# Patient Record
Sex: MALE | Race: WHITE | HISPANIC OR LATINO | Employment: FULL TIME | URBAN - METROPOLITAN AREA
[De-identification: names, ages, dates, MRNs, and addresses within clinical notes are randomized per-mention and may not be internally consistent; named-entity substitution may affect disease eponyms.]

---

## 2017-03-09 ENCOUNTER — HOSPITAL ENCOUNTER (OUTPATIENT)
Facility: MEDICAL CENTER | Age: 27
End: 2017-03-09
Attending: INTERNAL MEDICINE
Payer: COMMERCIAL

## 2017-03-09 PROCEDURE — 80053 COMPREHEN METABOLIC PANEL: CPT

## 2017-03-09 PROCEDURE — 83036 HEMOGLOBIN GLYCOSYLATED A1C: CPT

## 2017-03-09 PROCEDURE — 82306 VITAMIN D 25 HYDROXY: CPT

## 2017-03-09 PROCEDURE — 85027 COMPLETE CBC AUTOMATED: CPT

## 2017-03-09 PROCEDURE — 80061 LIPID PANEL: CPT

## 2017-03-09 PROCEDURE — 84443 ASSAY THYROID STIM HORMONE: CPT

## 2017-03-10 LAB
25(OH)D3 SERPL-MCNC: 13 NG/ML (ref 30–100)
ALBUMIN SERPL BCP-MCNC: 4.8 G/DL (ref 3.2–4.9)
ALBUMIN/GLOB SERPL: 1.5 G/DL
ALP SERPL-CCNC: 81 U/L (ref 30–99)
ALT SERPL-CCNC: 23 U/L (ref 2–50)
ANION GAP SERPL CALC-SCNC: 4 MMOL/L (ref 0–11.9)
AST SERPL-CCNC: 19 U/L (ref 12–45)
BILIRUB SERPL-MCNC: 0.7 MG/DL (ref 0.1–1.5)
BUN SERPL-MCNC: 21 MG/DL (ref 8–22)
CALCIUM SERPL-MCNC: 10.1 MG/DL (ref 8.5–10.5)
CHLORIDE SERPL-SCNC: 104 MMOL/L (ref 96–112)
CHOLEST SERPL-MCNC: 191 MG/DL (ref 100–199)
CO2 SERPL-SCNC: 30 MMOL/L (ref 20–33)
CREAT SERPL-MCNC: 1.24 MG/DL (ref 0.5–1.4)
ERYTHROCYTE [DISTWIDTH] IN BLOOD BY AUTOMATED COUNT: 43 FL (ref 35.9–50)
EST. AVERAGE GLUCOSE BLD GHB EST-MCNC: 103 MG/DL
GLOBULIN SER CALC-MCNC: 3.3 G/DL (ref 1.9–3.5)
GLUCOSE SERPL-MCNC: 82 MG/DL (ref 65–99)
HBA1C MFR BLD: 5.2 % (ref 0–5.6)
HCT VFR BLD AUTO: 50.7 % (ref 42–52)
HDLC SERPL-MCNC: 56 MG/DL
HGB BLD-MCNC: 17.1 G/DL (ref 14–18)
LDLC SERPL CALC-MCNC: 112 MG/DL
MCH RBC QN AUTO: 31.4 PG (ref 27–33)
MCHC RBC AUTO-ENTMCNC: 33.7 G/DL (ref 33.7–35.3)
MCV RBC AUTO: 93.2 FL (ref 81.4–97.8)
PLATELET # BLD AUTO: 272 K/UL (ref 164–446)
PMV BLD AUTO: 11.2 FL (ref 9–12.9)
POTASSIUM SERPL-SCNC: 4.5 MMOL/L (ref 3.6–5.5)
PROT SERPL-MCNC: 8.1 G/DL (ref 6–8.2)
RBC # BLD AUTO: 5.44 M/UL (ref 4.7–6.1)
SODIUM SERPL-SCNC: 138 MMOL/L (ref 135–145)
TRIGL SERPL-MCNC: 116 MG/DL (ref 0–149)
TSH SERPL DL<=0.005 MIU/L-ACNC: 1.46 UIU/ML (ref 0.3–3.7)
WBC # BLD AUTO: 4 K/UL (ref 4.8–10.8)

## 2018-06-25 ENCOUNTER — APPOINTMENT (OUTPATIENT)
Dept: RADIOLOGY | Facility: MEDICAL CENTER | Age: 28
End: 2018-06-25
Attending: EMERGENCY MEDICINE
Payer: COMMERCIAL

## 2018-06-25 ENCOUNTER — HOSPITAL ENCOUNTER (EMERGENCY)
Facility: MEDICAL CENTER | Age: 28
End: 2018-06-26
Attending: EMERGENCY MEDICINE
Payer: COMMERCIAL

## 2018-06-25 ENCOUNTER — OFFICE VISIT (OUTPATIENT)
Dept: URGENT CARE | Facility: CLINIC | Age: 28
End: 2018-06-25
Payer: COMMERCIAL

## 2018-06-25 VITALS
WEIGHT: 161 LBS | TEMPERATURE: 99.4 F | OXYGEN SATURATION: 94 % | HEIGHT: 67 IN | HEART RATE: 82 BPM | RESPIRATION RATE: 16 BRPM | BODY MASS INDEX: 25.27 KG/M2 | SYSTOLIC BLOOD PRESSURE: 112 MMHG | DIASTOLIC BLOOD PRESSURE: 78 MMHG

## 2018-06-25 DIAGNOSIS — K12.1 STOMATITIS: ICD-10-CM

## 2018-06-25 DIAGNOSIS — R68.83 CHILLS: ICD-10-CM

## 2018-06-25 DIAGNOSIS — N50.811 PAIN IN RIGHT TESTICLE: Primary | ICD-10-CM

## 2018-06-25 DIAGNOSIS — R11.14 BILIOUS VOMITING WITH NAUSEA: ICD-10-CM

## 2018-06-25 DIAGNOSIS — N45.1 EPIDIDYMITIS: ICD-10-CM

## 2018-06-25 DIAGNOSIS — R10.9 RIGHT FLANK PAIN: ICD-10-CM

## 2018-06-25 LAB
ALBUMIN SERPL BCP-MCNC: 4.2 G/DL (ref 3.2–4.9)
ALBUMIN/GLOB SERPL: 1.2 G/DL
ALP SERPL-CCNC: 74 U/L (ref 30–99)
ALT SERPL-CCNC: 22 U/L (ref 2–50)
ANION GAP SERPL CALC-SCNC: 9 MMOL/L (ref 0–11.9)
APPEARANCE UR: CLEAR
AST SERPL-CCNC: 24 U/L (ref 12–45)
BASOPHILS # BLD AUTO: 0.3 % (ref 0–1.8)
BASOPHILS # BLD: 0.03 K/UL (ref 0–0.12)
BILIRUB SERPL-MCNC: 1 MG/DL (ref 0.1–1.5)
BILIRUB UR QL STRIP.AUTO: NEGATIVE
BUN SERPL-MCNC: 16 MG/DL (ref 8–22)
CALCIUM SERPL-MCNC: 9.1 MG/DL (ref 8.4–10.2)
CHLORIDE SERPL-SCNC: 103 MMOL/L (ref 96–112)
CO2 SERPL-SCNC: 24 MMOL/L (ref 20–33)
COLOR UR: YELLOW
CREAT SERPL-MCNC: 1.21 MG/DL (ref 0.5–1.4)
EOSINOPHIL # BLD AUTO: 0.04 K/UL (ref 0–0.51)
EOSINOPHIL NFR BLD: 0.4 % (ref 0–6.9)
ERYTHROCYTE [DISTWIDTH] IN BLOOD BY AUTOMATED COUNT: 38.8 FL (ref 35.9–50)
GLOBULIN SER CALC-MCNC: 3.5 G/DL (ref 1.9–3.5)
GLUCOSE SERPL-MCNC: 115 MG/DL (ref 65–99)
GLUCOSE UR STRIP.AUTO-MCNC: NEGATIVE MG/DL
HCT VFR BLD AUTO: 43 % (ref 42–52)
HGB BLD-MCNC: 15.6 G/DL (ref 14–18)
IMM GRANULOCYTES # BLD AUTO: 0.04 K/UL (ref 0–0.11)
IMM GRANULOCYTES NFR BLD AUTO: 0.4 % (ref 0–0.9)
KETONES UR STRIP.AUTO-MCNC: NEGATIVE MG/DL
LEUKOCYTE ESTERASE UR QL STRIP.AUTO: NEGATIVE
LYMPHOCYTES # BLD AUTO: 1.42 K/UL (ref 1–4.8)
LYMPHOCYTES NFR BLD: 13.2 % (ref 22–41)
MCH RBC QN AUTO: 31.9 PG (ref 27–33)
MCHC RBC AUTO-ENTMCNC: 36.3 G/DL (ref 33.7–35.3)
MCV RBC AUTO: 87.9 FL (ref 81.4–97.8)
MICRO URNS: NORMAL
MONOCYTES # BLD AUTO: 0.61 K/UL (ref 0–0.85)
MONOCYTES NFR BLD AUTO: 5.7 % (ref 0–13.4)
NEUTROPHILS # BLD AUTO: 8.6 K/UL (ref 1.82–7.42)
NEUTROPHILS NFR BLD: 80 % (ref 44–72)
NITRITE UR QL STRIP.AUTO: NEGATIVE
NRBC # BLD AUTO: 0 K/UL
NRBC BLD-RTO: 0 /100 WBC
PH UR STRIP.AUTO: 6 [PH]
PLATELET # BLD AUTO: 229 K/UL (ref 164–446)
PMV BLD AUTO: 10.8 FL (ref 9–12.9)
POTASSIUM SERPL-SCNC: 3.4 MMOL/L (ref 3.6–5.5)
PROT SERPL-MCNC: 7.7 G/DL (ref 6–8.2)
PROT UR QL STRIP: NEGATIVE MG/DL
RBC # BLD AUTO: 4.89 M/UL (ref 4.7–6.1)
RBC UR QL AUTO: NEGATIVE
SODIUM SERPL-SCNC: 136 MMOL/L (ref 135–145)
SP GR UR STRIP.AUTO: <=1.005
WBC # BLD AUTO: 10.7 K/UL (ref 4.8–10.8)

## 2018-06-25 PROCEDURE — 80053 COMPREHEN METABOLIC PANEL: CPT

## 2018-06-25 PROCEDURE — 36415 COLL VENOUS BLD VENIPUNCTURE: CPT

## 2018-06-25 PROCEDURE — 81003 URINALYSIS AUTO W/O SCOPE: CPT

## 2018-06-25 PROCEDURE — 87880 STREP A ASSAY W/OPTIC: CPT

## 2018-06-25 PROCEDURE — 99285 EMERGENCY DEPT VISIT HI MDM: CPT

## 2018-06-25 PROCEDURE — 87081 CULTURE SCREEN ONLY: CPT

## 2018-06-25 PROCEDURE — 76870 US EXAM SCROTUM: CPT

## 2018-06-25 PROCEDURE — 99205 OFFICE O/P NEW HI 60 MIN: CPT | Performed by: PHYSICIAN ASSISTANT

## 2018-06-25 PROCEDURE — 85025 COMPLETE CBC W/AUTO DIFF WBC: CPT

## 2018-06-25 RX ORDER — DOXYCYCLINE 100 MG/1
100 TABLET ORAL ONCE
Status: COMPLETED | OUTPATIENT
Start: 2018-06-26 | End: 2018-06-26

## 2018-06-25 ASSESSMENT — PAIN SCALES - GENERAL
PAINLEVEL_OUTOF10: 6
PAINLEVEL_OUTOF10: 5

## 2018-06-26 VITALS
SYSTOLIC BLOOD PRESSURE: 119 MMHG | TEMPERATURE: 98.5 F | BODY MASS INDEX: 25.26 KG/M2 | WEIGHT: 160.94 LBS | RESPIRATION RATE: 16 BRPM | HEIGHT: 67 IN | DIASTOLIC BLOOD PRESSURE: 65 MMHG | HEART RATE: 78 BPM | OXYGEN SATURATION: 95 %

## 2018-06-26 LAB
S PYO AG THROAT QL: NORMAL
SIGNIFICANT IND 70042: NORMAL
SITE SITE: NORMAL
SOURCE SOURCE: NORMAL

## 2018-06-26 PROCEDURE — 700102 HCHG RX REV CODE 250 W/ 637 OVERRIDE(OP): Performed by: EMERGENCY MEDICINE

## 2018-06-26 PROCEDURE — 700111 HCHG RX REV CODE 636 W/ 250 OVERRIDE (IP)

## 2018-06-26 PROCEDURE — 96375 TX/PRO/DX INJ NEW DRUG ADDON: CPT

## 2018-06-26 PROCEDURE — 96374 THER/PROPH/DIAG INJ IV PUSH: CPT

## 2018-06-26 PROCEDURE — A9270 NON-COVERED ITEM OR SERVICE: HCPCS | Performed by: EMERGENCY MEDICINE

## 2018-06-26 PROCEDURE — 700111 HCHG RX REV CODE 636 W/ 250 OVERRIDE (IP): Performed by: EMERGENCY MEDICINE

## 2018-06-26 RX ORDER — HYDROCODONE BITARTRATE AND ACETAMINOPHEN 5; 325 MG/1; MG/1
1 TABLET ORAL EVERY 8 HOURS PRN
Qty: 20 TAB | Refills: 0 | Status: SHIPPED | OUTPATIENT
Start: 2018-06-26 | End: 2018-07-02

## 2018-06-26 RX ORDER — ONDANSETRON 2 MG/ML
4 INJECTION INTRAMUSCULAR; INTRAVENOUS ONCE
Status: COMPLETED | OUTPATIENT
Start: 2018-06-26 | End: 2018-06-26

## 2018-06-26 RX ORDER — DIPHENHYDRAMINE HYDROCHLORIDE AND LIDOCAINE HYDROCHLORIDE AND ALUMINUM HYDROXIDE AND MAGNESIUM HYDRO
10 KIT
Qty: 300 ML | Refills: 0 | Status: SHIPPED | OUTPATIENT
Start: 2018-06-26 | End: 2023-10-16

## 2018-06-26 RX ORDER — KETOROLAC TROMETHAMINE 30 MG/ML
INJECTION, SOLUTION INTRAMUSCULAR; INTRAVENOUS
Status: COMPLETED
Start: 2018-06-26 | End: 2018-06-26

## 2018-06-26 RX ORDER — DOXYCYCLINE 100 MG/1
100 CAPSULE ORAL 2 TIMES DAILY
Qty: 20 CAP | Refills: 0 | Status: SHIPPED | OUTPATIENT
Start: 2018-06-26 | End: 2023-10-16

## 2018-06-26 RX ORDER — MORPHINE SULFATE 4 MG/ML
4 INJECTION, SOLUTION INTRAMUSCULAR; INTRAVENOUS ONCE
Status: DISCONTINUED | OUTPATIENT
Start: 2018-06-26 | End: 2018-06-26 | Stop reason: HOSPADM

## 2018-06-26 RX ORDER — KETOROLAC TROMETHAMINE 30 MG/ML
30 INJECTION, SOLUTION INTRAMUSCULAR; INTRAVENOUS ONCE
Status: COMPLETED | OUTPATIENT
Start: 2018-06-26 | End: 2018-06-26

## 2018-06-26 RX ADMIN — DOXYCYCLINE 100 MG: 100 TABLET, FILM COATED ORAL at 00:02

## 2018-06-26 RX ADMIN — KETOROLAC TROMETHAMINE 30 MG: 30 INJECTION, SOLUTION INTRAMUSCULAR at 00:08

## 2018-06-26 RX ADMIN — KETOROLAC TROMETHAMINE 30 MG: 30 INJECTION, SOLUTION INTRAMUSCULAR; INTRAVENOUS at 00:08

## 2018-06-26 RX ADMIN — ONDANSETRON 4 MG: 2 INJECTION, SOLUTION INTRAMUSCULAR; INTRAVENOUS at 00:07

## 2018-06-26 NOTE — ED PROVIDER NOTES
"ED Provider Note    CHIEF COMPLAINT  Chief Complaint   Patient presents with   • Sent from Urgent Care   • Testicular Pain       HPI  Joselito Fried is a 28 y.o. male who presents to the emergency room today symptom urgent care for right testicular pain. She states the pain over his right testicle area for the past several days denies any trauma to the area. Pain has been getting worse there concern about possible torsion sentiment emergency room for evaluation. Also complains of chills, sore throat. Nausea vomiting no fever. Denies any trauma to the pain is over his right testicular area radiates up towards lower abdomen.    REVIEW OF SYSTEMS  See HPI for further details. All other systems are negative.     PAST MEDICAL HISTORY  No past medical history on file.    FAMILY HISTORY  [unfilled]    SOCIAL HISTORY  Social History     Social History   • Marital status:      Spouse name: N/A   • Number of children: N/A   • Years of education: N/A     Social History Main Topics   • Smoking status: Never Smoker   • Smokeless tobacco: Never Used   • Alcohol use Yes      Comment: soc   • Drug use: No   • Sexual activity: Yes     Partners: Female     Other Topics Concern   • Not on file     Social History Narrative   • No narrative on file       SURGICAL HISTORY  No past surgical history on file.    CURRENT MEDICATIONS  Home Medications     Reviewed by Janny Erickson R.N. (Registered Nurse) on 06/25/18 at 2233  Med List Status: Complete   Medication Last Dose Status        Patient Raudel Taking any Medications                       ALLERGIES  No Known Allergies    PHYSICAL EXAM  VITAL SIGNS: /80   Pulse 84   Temp 37.6 °C (99.6 °F)   Resp 19   Ht 1.702 m (5' 7\")   Wt 73 kg (160 lb 15 oz)   SpO2 95%   BMI 25.21 kg/m²       Constitutional: Well developed, Well nourished,  acute distress, Non-toxic appearance.   HENT: Normocephalic, Atraumatic, Bilateral external ears normal, Oropharynx moist, No oral exudates, " Nose normal. Multiple areas of ulcerations over the soft palate and buccal mucosa consistent with stomatitis. Surrounding erythema  Eyes: PERRLA, EOMI, Conjunctiva normal, No discharge.   Neck: Normal range of motion, No tenderness, Supple, No stridor.   Lymphatic: Right submandibular lymphadenopathy noted.   Cardiovascular: Normal heart rate, Normal rhythm, No murmurs, No rubs, No gallops.   Thorax & Lungs: Normal breath sounds, No respiratory distress, No wheezing, No chest tenderness.   Abdomen: Bowel sounds normal, Soft, No tenderness, No masses, No pulsatile masses.   Skin: Warm, Dry, No erythema, No rash.   Back: No tenderness, No CVA tenderness.   Genitalia: External genitalia appear normal, No masses or lesions. Patient has tenderness over the epididymis on the right there is no masses lesions noted. No evidence of hernia.  Extremities: Intact distal pulses, No edema, No tenderness, No cyanosis, No clubbing.   Musculoskeletal: Good range of motion in all major joints. No tenderness to palpation or major deformities noted.   Neurologic: Alert & oriented x 3, Normal motor function, Normal sensory function, No focal deficits noted.   Psychiatric: Affect normal, Judgment normal, Mood normal.       RADIOLOGY/PROCEDURES  [id number]   Final Result         1.  Increased flow in the right testicle and epididymis, appearance most compatible with epididymal orchitis.   2.  Subtle hyperechoic areas in the peripheral right testis, of uncertain significance, recommend follow-up scrotal sonogram in 6 weeks for repeat characterization.   3.  Right epididymal cyst, multiple tiny left epididymal cyst   4.  Small bilateral hydrocele with specular debris            COURSE & MEDICAL DECISION MAKING  Pertinent Labs & Imaging studies reviewed. (See chart for details)  Reviewed ultrasound with the patient there is evidence of epididymitis/orchitis, patient given doxycycline. Advised Motrin support may also use ice to the  area given referral to urologist Dr. Haro. Magic mouthwash for sore throat, [place] for pain. [place] controlled substance regulations reviewed, consent signed. Patient had no questions. [facility] site is not available. Return for persistent worsening symptoms may use Motrin/Tylenol. No evidence of torsion on ultrasound    FINAL IMPRESSION  1. Acute epididymitis/orchitis right testicle  2.  stomatitis  3.         Electronically signed by: Deandre Waldrop, 6/26/2018 12:09 AM

## 2018-06-26 NOTE — DISCHARGE INSTRUCTIONS
Epididymitis  Introduction  Epididymitis is swelling (inflammation) of the epididymis. The epididymis is a cord-like structure that is located along the top and back part of the testicle. It collects and stores sperm from the testicle.  This condition can also cause pain and swelling of the testicle and scrotum. Symptoms usually start suddenly (acute epididymitis). Sometimes epididymitis starts gradually and lasts for a while (chronic epididymitis). This type may be harder to treat.  What are the causes?  In men 35 and younger, this condition is usually caused by a bacterial infection or sexually transmitted disease (STD), such as:  · Gonorrhea.  · Chlamydia.  In men 35 and older who do not have anal sex, this condition is usually caused by bacteria from a blockage or abnormalities in the urinary system. These can result from:  · Having a tube placed into the bladder (urinary catheter).  · Having an enlarged or inflamed prostate gland.  · Having recent urinary tract surgery.  In men who have a condition that weakens the body’s defense system (immune system), such as HIV, this condition can be caused by:  · Other bacteria, including tuberculosis and syphilis.  · Viruses.  · Fungi.  Sometimes this condition occurs without infection. That may happen if urine flows backward into the epididymis after heavy lifting or straining.  What increases the risk?  This condition is more likely to develop in men:  · Who have unprotected sex with more than one partner.  · Who have anal sex.  · Who have recently had surgery.  · Who have a urinary catheter.  · Who have urinary problems.  · Who have a suppressed immune system.  What are the signs or symptoms?  This condition usually begins suddenly with chills, fever, and pain behind the scrotum and in the testicle. Other symptoms include:  · Swelling of the scrotum, testicle, or both.  · Pain when ejaculating or urinating.  · Pain in the back or belly.  · Nausea.  · Itching and  discharge from the penis.  · Frequent need to pass urine.  · Redness and tenderness of the scrotum.  How is this diagnosed?  Your health care provider can diagnose this condition based on your symptoms and medical history. Your health care provider will also do a physical exam to ask about your symptoms and check your scrotum and testicle for swelling, pain, and redness. You may also have other tests, including:  · Examination of discharge from the penis.  · Urine tests for infections, such as STDs.  Your health care provider may test you for other STDs, including HIV.  How is this treated?  Treatment for this condition depends on the cause. If your condition is caused by a bacterial infection, oral antibiotic medicine may be prescribed. If the bacterial infection has spread to your blood, you may need to receive IV antibiotics. Nonbacterial epididymitis is treated with home care that includes bed rest and elevation of the scrotum.  Surgery may be needed to treat:  · Bacterial epididymitis that causes pus to build up in the scrotum (abscess).  · Chronic epididymitis that has not responded to other treatments.  Follow these instructions at home:  Medicines  · Take over-the-counter and prescription medicines only as told by your health care provider.  · If you were prescribed an antibiotic medicine, take it as told by your health care provider. Do not stop taking the antibiotic even if your condition improves.  Sexual Activity  · If your epididymitis was caused by an STD, avoid sexual activity until your treatment is complete.  · Inform your sexual partner or partners if you test positive for an STD. They may need to be treated. Do not engage in sexual activity with your partner or partners until their treatment is completed.  General instructions  · Return to your normal activities as told by your health care provider. Ask your health care provider what activities are safe for you.  · Keep your scrotum elevated and  supported while resting. Ask your health care provider if you should wear a scrotal support, such as a jockstrap. Wear it as told by your health care provider.  · If directed, apply ice to the affected area:  ¨ Put ice in a plastic bag.  ¨ Place a towel between your skin and the bag.  ¨ Leave the ice on for 20 minutes, 2-3 times per day.  · Try taking a sitz bath to help with discomfort. This is a warm water bath that is taken while you are sitting down. The water should only come up to your hips and should cover your buttocks. Do this 3-4 times per day or as told by your health care provider.  · Keep all follow-up visits as told by your health care provider. This is important.  Contact a health care provider if:  · You have a fever.  · Your pain medicine is not helping.  · Your pain is getting worse.  · Your symptoms do not improve within three days.  This information is not intended to replace advice given to you by your health care provider. Make sure you discuss any questions you have with your health care provider.  Document Released: 12/15/2001 Document Revised: 05/25/2017 Document Reviewed: 05/04/2016  © 2017 Elsevier      Stomatitis  Stomatitis is a condition that causes swelling (inflammation) in your mouth. It can affect a part of your mouth or your whole mouth. The condition often affects your cheek, teeth, gums, lips, and tongue. Stomatitis can also affect the mucous membranes that surround your mouth (mucosa).  Pain from stomatitis can make it hard for you to eat or drink. Very bad cases of this condition can lead to not getting enough fluid in your body (dehydration) or poor nutrition.  Follow these instructions at home:  Medicines  · Take medicines only as told by your doctor.  · If you were prescribed an antibiotic, finish all of it even if you start to feel better.  Lifestyle  · Take good care of your mouth and teeth (oral hygiene):  ¨ Gently brush your teeth with a soft, nylon-bristled toothbrush  two times each day.  ¨ Floss your teeth every day.  ¨ Have your teeth cleaned regularly. Do this as told by your dentist.  · Eat a balanced diet. Do not eat:  ¨ Spicy foods.  ¨ Citrus, such as oranges.  ¨ Foods that have sharp edges, such as chips.  · Avoid any foods or other things that you think may be causing this condition.  · If you have dentures, make sure that they fit the way that they should.  · Do not use any tobacco products, including cigarettes, chewing tobacco, or electronic cigarettes. If you need help quitting, ask your doctor.  · Find ways to lower your stress. Try yoga or meditation. Ask your doctor for other ideas.  General instructions  · Use a salt-water rinse for pain as told by your doctor. Mix 1 tsp of salt in 2 cups of water.  · Drink enough fluid to keep your pee (urine) clear or pale yellow. This will keep you hydrated.  Contact a doctor if:  · Your symptoms get worse.  · You develop new symptoms, especially:  ¨ A rash.  ¨ New symptoms that do not involve your mouth area.  · Your symptoms last longer than three weeks.  · Your stomatitis goes away and then comes back.  · You have a harder time eating and drinking normally.  · You are more tired.  · You feel weaker.  · You stop feeling hungry.  · You feel sick to your stomach (nauseous).  · You have a fever.  This information is not intended to replace advice given to you by your health care provider. Make sure you discuss any questions you have with your health care provider.  Document Released: 12/06/2012 Document Revised: 08/16/2017 Document Reviewed: 12/14/2015  Elsevier Interactive Patient Education © 2017 [employer] Inc[employer]

## 2018-06-26 NOTE — ED NOTES
Rounded on patient.  Oriented to the room and call button. Patient assessed, chart reviewed. Call light within reach, bed in lowest position. Patient updated on plan of care, no additional needs at this time. Will continue to monitor.     Line lab obtained

## 2018-06-26 NOTE — ED NOTES
Pt bib family with c/o right testicle pain for the past few days. Pt also reporting dysuria, lower abd and back pain. Pt states he was seen at [facility] in [place] and advised to be seen in the ED for further evaluation

## 2018-06-26 NOTE — PATIENT INSTRUCTIONS
Orchitis  Introduction  Orchitis is swelling (inflammation) of a testicle caused by infection. Testicles are the male organs that produce sperm. The testicles are held in a fleshy sac (scrotum) located behind the penis. Orchitis usually affects only one testicle, but it can occur in both. The condition can develop suddenly.  Orchitis can be caused by many different kinds of bacteria and viruses.  What are the causes?  Orchitis can be caused by either a bacterial or viral infection.  Bacterial Infections  · These often occur along with an infection of the coiled tube that collects sperm and sits on top of the testicle (epididymis).  · In men who are not sexually active, bacterial orchitis usually starts as a urinary tract infection and spreads to the testicle.  · In sexually active men, sexually transmitted infections are the most common cause of bacterial orchitis. These can include:  ¨ Gonorrhea.  ¨ Chlamydia.  Viral Infections  · Mumps is still the most common cause of viral orchitis, though mumps is now rare in many areas because of vaccination.  · Other viruses that can cause orchitis include:  ¨ The chickenpox virus (varicella-zoster virus).  ¨ The virus that causes mononucleosis (Shay-Barr virus).  What increases the risk?  Boys and men who have not been vaccinated against mumps are at risk for mumps orchitis.  Risk factors for bacterial orchitis include:  · Frequent urinary tract infections.  · High-risk sexual behaviors.  · Having a sexual partner with a sexually transmitted infection.  · Having had urinary tract surgery.  · Using a tube passed through the penis to drain urine (Young catheter).  · An enlarged prostate gland.  What are the signs or symptoms?  The most common symptoms of orchitis are swelling and pain in the scrotum. Other signs and symptoms may include:  · Feeling generally sick (malaise).  · Fever and chills.  · Painful urination.  · Painful ejaculation.  · Blood or discharge from the  penis.  · Nausea.  · Headache.  · Fatigue.  How is this diagnosed?  Your health care provider may suspect orchitis if you have a painful, swollen testicle along with other signs and symptoms of the condition. A physical exam will be done. Tests may also be done to help your health care provider make a diagnosis. These may include:  · A blood test to check for signs of infection.  · A urine test to check for a urinary tract infection.  · Using a swab to collect a fluid sample from the tip of the penis to test for sexually transmitted infections.  · Taking an image of the testicle using sound waves and a computer (testicular ultrasound).  How is this treated?  Treatment of orchitis depends on the cause. For orchitis caused by a bacterial infection, your health care provider will most likely prescribe antibiotic medicines. Bacterial infections usually clear up within a few days.  Both viral infections and bacterial infections may be treated with:  · Bed rest.  · Anti-inflammatory medicines.  · Pain medicines.  · Elevating the scrotum and applying ice.  Follow these instructions at home:  · Rest as directed by your health care provider.  · Take medicines only as directed by your health care provider.  · If you were prescribed an antibiotic medicine, finish it all even if you start to feel better.  · Elevate your scrotum and apply ice as directed:  ¨ Put ice in a plastic bag.  ¨ Place a small towel or pillow between your legs.  ¨ Rest your scrotum on the pillow or towel.  ¨ Place another towel between your skin and the plastic bag.  ¨ Leave the ice on for 20 minutes, 2-3 times a day.  Contact a health care provider if:  · You have a fever.  · Pain and swelling have not gotten better after 3 days.  Get help right away if:  · Your pain is getting worse.  · The swelling in your testicle gets worse.  This information is not intended to replace advice given to you by your health care provider. Make sure you discuss any  questions you have with your health care provider.  Document Released: 12/15/2001 Document Revised: 05/25/2017 Document Reviewed: 05/07/2015  © 2017 Elsevier

## 2018-06-26 NOTE — PROGRESS NOTES
"Subjective:      Pt is a 28 y.o. male who presents with Abdominal Pain (x 3 days had chills, emesis, flank pain, some pain on testicles)            HPI  Pt states he had eaten food celebrating his wife's bday in [place] 3 days ago and then noted chills, NV, abd pain and then bought laxatives as he felt constipated and now noted b/l flank pain and abd pain and right testicle pain x 2-3 days. Pt has not taken any Rx medications for this condition. Pt states the pain is a 7/10 in his right testicle, aching in nature and worse \"right now\". Pt denies CP, SOB,  paresthesias, headaches, dizziness, change in vision, hives, or other joint pain. The pt's medication list, problem list, and allergies have been evaluated and reviewed during today's visit.    PMH:  Negative per pt.      PSH:  Negative per pt.      Fam Hx:  the patient's family history is not pertinent to their current complaint      Soc HX:  Social History     Social History   • Marital status:      Spouse name: N/A   • Number of children: N/A   • Years of education: N/A     Occupational History   • Not on file.     Social History Main Topics   • Smoking status: Never Smoker   • Smokeless tobacco: Never Used   • Alcohol use Yes      Comment: soc   • Drug use: No   • Sexual activity: Yes     Partners: Female     Other Topics Concern   • Not on file     Social History Narrative   • No narrative on file         Medications:  No current facility-administered medications for this visit.     Current Outpatient Prescriptions:   •  doxycycline (MONODOX) 100 MG capsule, Take 1 Cap by mouth 2 times a day., Disp: 20 Cap, Rfl: 0  •  HYDROcodone-acetaminophen (NORCO) 5-325 MG Tab per tablet, Take 1 Tab by mouth every 8 hours as needed (pain) for up to 6 days., Disp: 20 Tab, Rfl: 0  •  DPH-Lido-AlHydr-MgHydr-Simeth (MAGIC MOUTHWASH BLM) Suspension, Take 10 mL by mouth ACHS PRN (throat pain)., Disp: 300 mL, Rfl: 0    Facility-Administered Medications Ordered in Other " "Visits:   •  morphine (pf) 4 mg/ml injection 4 mg, 4 mg, Intravenous, Once, Deandre Waldrop D.O.      Allergies:  Patient has no known allergies.    ROS  Constitutional: Negative for fever, malaise/fatigue. +chills  HENT: Negative for congestion and sore throat.    Eyes: Negative for blurred vision, double vision and photophobia.   Respiratory: Negative for cough and shortness of breath.    Cardiovascular: Negative for chest pain and palpitations.   Gastrointestinal: POS nausea, vomiting, abdominal pain, and constipation. NEG for diarrhea  Genitourinary: POS flank pain. +right testicle pain  Musculoskeletal: Negative for joint pain and myalgias.   Skin: Negative for itching and rash.   Neurological: Negative for dizziness, tingling and headaches.   Endo/Heme/Allergies: Does not bruise/bleed easily.   Psychiatric/Behavioral: Negative for depression. The patient is not nervous/anxious.           Objective:     /78   Pulse 82   Temp 37.4 °C (99.4 °F)   Resp 16   Ht 1.702 m (5' 7\")   Wt 73 kg (161 lb)   SpO2 94%   BMI 25.22 kg/m²      Physical Exam   Abdominal: Soft. Normal appearance and bowel sounds are normal. He exhibits no shifting dullness, no distension, no pulsatile liver, no fluid wave, no abdominal bruit, no ascites, no pulsatile midline mass and no mass. There is no hepatosplenomegaly. There is tenderness in the right lower quadrant and left lower quadrant. There is no rigidity, no rebound, no guarding, no CVA tenderness, no tenderness at McBurney's point and negative Hair's sign. No hernia. Hernia confirmed negative in the right inguinal area and confirmed negative in the left inguinal area.       Genitourinary: Penis normal. Right testis shows mass, swelling and tenderness. Right testis is descended. Cremasteric reflex is not absent on the right side. Left testis shows no mass, no swelling and no tenderness. Left testis is descended. Cremasteric reflex is not absent on the left side. No " phimosis, paraphimosis, hypospadias, penile erythema or penile tenderness. No discharge found.         Lymphadenopathy: No inguinal adenopathy noted on the right or left side.         Constitutional: PT is oriented to person, place, and time. PT appears well-developed and well-nourished. No distress.   HENT:   Head: Normocephalic and atraumatic.   Mouth/Throat: Oropharynx is clear and moist. No oropharyngeal exudate.   Eyes: Conjunctivae normal and EOM are normal. Pupils are equal, round, and reactive to light.   Neck: Normal range of motion. Neck supple. No thyromegaly present.   Cardiovascular: Normal rate, regular rhythm, normal heart sounds and intact distal pulses.  Exam reveals no gallop and no friction rub.    No murmur heard.  Pulmonary/Chest: Effort normal and breath sounds normal. No respiratory distress. PT has no wheezes. PT has no rales. Pt exhibits no tenderness.   Musculoskeletal: Normal range of motion. PT exhibits no edema and no tenderness.   Neurological: PT is alert and oriented to person, place, and time. PT has normal reflexes. No cranial nerve deficit.   Skin: Skin is warm and dry. No rash noted. PT is not diaphoretic. No erythema.       Psychiatric: PT has a normal mood and affect. PT behavior is normal. Judgment and thought content normal.          Assessment/Plan:     1. Pain in right testicle      2. Chills      3. Bilious vomiting with nausea      4. Right flank pain    TO [facility] ER NOW FOR US scrotum and further evaluation. Spoke with Dr. Waldrop on the phone, attending at the ER , and he graciously accepted transfer of care for further imaging and evaluation of the pt.  Pt to go POV to ER now  Unable to obtain outpatient US and concern for testicular torsion requires higher level of triage  Rest, fluids encouraged.  Pt was in full understanding and agreement with the plan.  Follow-up as needed if symptoms worsen or fail to improve.

## 2018-06-28 LAB
S PYO SPEC QL CULT: NORMAL
SIGNIFICANT IND 70042: NORMAL
SITE SITE: NORMAL
SOURCE SOURCE: NORMAL

## 2020-10-16 ENCOUNTER — IMMUNIZATION (OUTPATIENT)
Dept: SOCIAL WORK | Facility: CLINIC | Age: 30
End: 2020-10-16
Payer: COMMERCIAL

## 2020-10-16 DIAGNOSIS — Z23 NEED FOR VACCINATION: Primary | ICD-10-CM

## 2020-10-16 PROCEDURE — 90686 IIV4 VACC NO PRSV 0.5 ML IM: CPT | Performed by: REGISTERED NURSE

## 2020-10-16 PROCEDURE — 90471 IMMUNIZATION ADMIN: CPT | Performed by: REGISTERED NURSE

## 2023-10-16 ENCOUNTER — OFFICE VISIT (OUTPATIENT)
Dept: URGENT CARE | Facility: PHYSICIAN GROUP | Age: 33
End: 2023-10-16
Payer: COMMERCIAL

## 2023-10-16 VITALS
TEMPERATURE: 98.4 F | OXYGEN SATURATION: 97 % | BODY MASS INDEX: 26.82 KG/M2 | WEIGHT: 170.86 LBS | RESPIRATION RATE: 16 BRPM | HEART RATE: 68 BPM | SYSTOLIC BLOOD PRESSURE: 114 MMHG | DIASTOLIC BLOOD PRESSURE: 82 MMHG | HEIGHT: 67 IN

## 2023-10-16 DIAGNOSIS — K59.00 CONSTIPATION, UNSPECIFIED CONSTIPATION TYPE: Primary | ICD-10-CM

## 2023-10-16 PROCEDURE — 3074F SYST BP LT 130 MM HG: CPT

## 2023-10-16 PROCEDURE — 3079F DIAST BP 80-89 MM HG: CPT

## 2023-10-16 PROCEDURE — 99213 OFFICE O/P EST LOW 20 MIN: CPT

## 2023-10-16 RX ORDER — POLYETHYLENE GLYCOL 3350 17 G/17G
17 POWDER, FOR SOLUTION ORAL DAILY
Qty: 14 EACH | Refills: 0 | Status: SHIPPED | OUTPATIENT
Start: 2023-10-16

## 2023-10-16 RX ORDER — DOCUSATE SODIUM 100 MG/1
100 CAPSULE, LIQUID FILLED ORAL 2 TIMES DAILY
Qty: 60 CAPSULE | Refills: 0 | Status: SHIPPED | OUTPATIENT
Start: 2023-10-16 | End: 2023-11-15

## 2023-10-16 ASSESSMENT — ENCOUNTER SYMPTOMS
PALPITATIONS: 0
ABDOMINAL PAIN: 0
NAUSEA: 0
VOMITING: 0
FEVER: 0
DIARRHEA: 1
CHILLS: 0
BLOOD IN STOOL: 0
MYALGIAS: 0
CONSTIPATION: 1

## 2023-10-16 NOTE — PROGRESS NOTES
Subjective:   Joselito Fried is a 33 y.o. male who presents for Constipation (Abd pain, bloating, difficulty sleeping. Last BM x1 day, mainly liquid. Has tried prune juice and laxatives with no relief. Was in [place] x2 weeks when symptoms initially started. )          I introduced myself to the patient and informed them that I am a family nurse practitioner.    HPI: Joselito comes in today c/o constipation.  He has had a few episodes of diarrhea.  Onset was 2 weeks ago when he was in [place]. He states he has had some BM's since getting back from [place], but they have been small and the stool has been firm.  Patient describes symptoms as intermittent.  He denies any abdominal pain.  Aggravating factors include none. Relieving factors include none. Treatments tried at home include pushing fluids. They describe their symptoms as mild to moderate. He states he does have a history of constipation, often goes to 3 days without a BM but has never gone this long without a decent one.       Review of Systems   Constitutional:  Negative for chills, fever and malaise/fatigue.   Cardiovascular:  Negative for chest pain and palpitations.   Gastrointestinal:  Positive for constipation and diarrhea. Negative for abdominal pain, blood in stool, melena, nausea and vomiting.   Genitourinary:  Negative for dysuria.   Musculoskeletal:  Negative for myalgias.       Medications: doxycycline  MAGIC MOUTHWASH BLM Susp    Allergies: Patient has no known allergies.    Problem List: does not have a problem list on file.    Surgical History:  No past surgical history on file.    Past Social Hx:   reports that he has never smoked. He has never used smokeless tobacco. He reports current alcohol use. He reports that he does not use drugs.     Past Family Hx:   family history is not on file.     Problem list, medications, and allergies reviewed by myself today in Epic.     Objective:     /82 (BP Location: Right arm, Patient Position: Sitting,  "BP Cuff Size: Adult)   Pulse 68   Temp 36.9 °C (98.4 °F) (Temporal)   Resp 16   Ht 1.702 m (5' 7\")   Wt 77.5 kg (170 lb 13.7 oz)   SpO2 97%   BMI 26.76 kg/m²     During this visit, appropriate PPE was worn, and hand hygiene was performed.    Physical Exam  Vitals reviewed.   Constitutional:       General: He is not in acute distress.     Appearance: Normal appearance. He is not ill-appearing or toxic-appearing.   HENT:      Head: Normocephalic and atraumatic.      Right Ear: External ear normal.      Left Ear: External ear normal.      Nose: No congestion or rhinorrhea.      Mouth/Throat:      Pharynx: Oropharynx is clear.   Eyes:      General:         Right eye: No discharge.         Left eye: No discharge.      Extraocular Movements: Extraocular movements intact.      Conjunctiva/sclera: Conjunctivae normal.      Pupils: Pupils are equal, round, and reactive to light.   Cardiovascular:      Rate and Rhythm: Normal rate and regular rhythm.      Heart sounds: Normal heart sounds. No murmur heard.     No friction rub. No gallop.   Pulmonary:      Breath sounds: Normal breath sounds. No wheezing, rhonchi or rales.   Abdominal:      General: Bowel sounds are normal. There is no distension.      Palpations: Abdomen is soft. There is no mass.      Tenderness: There is abdominal tenderness. There is no right CVA tenderness, left CVA tenderness, guarding or rebound.      Hernia: No hernia is present.      Comments: Some mild LLQ TTP. Digital rectal exam does indicate hard stool in rectum.   Musculoskeletal:         General: Normal range of motion.      Cervical back: Normal range of motion. No rigidity.      Right lower leg: No edema.      Left lower leg: No edema.   Lymphadenopathy:      Cervical: No cervical adenopathy.   Skin:     General: Skin is warm and dry.      Coloration: Skin is not jaundiced.   Neurological:      Mental Status: He is alert and oriented to person, place, and time. Mental status is at " baseline.   Psychiatric:         Mood and Affect: Mood normal.         Behavior: Behavior normal.         Assessment/Plan:     Diagnosis and associated orders:     No diagnosis found.   Comments/MDM:     1. Constipation, unspecified constipation type  Patient states he does have a hx of constipation. No acute abdomen, denies abdominal pain, minimal TTP LLQ in clinic today, no N+V, no blood or mucous in stool.  He is afebrile and vital signs are stable and reassuring.  I instructed him regarding the following-  Continue to drink plenty of fluids  Increase fiber in diet.  Written information provided  Stool softeners twice a day  OTC fleets mineral oil enema, instructed him how to administer, hold for as long as possible before going to the bathroom.  Follow-up with a fleets saline enema.  - polyethylene glycol/lytes (MIRALAX) 17 g Pack; Take 1 Packet by mouth every day.  Dispense: 14 Each; Refill: 0  Discussed red flags and when to return to urgent care versus when to go to the emergency room.  Written information given to patient regarding constipation, high-fiber diet, and meds prescribed.  I did go over these with the patient in the clinic and answered all his questions.  Patient states he understands all instructions and is agreeable with the plan of care.       Pt is clinically stable at today's acute urgent care visit. Vital signs are normal and reassuring.  No acute distress noted. Appropriate for outpatient management at this time.       Discussed DDx, management options (risks,benefits, and alternatives to planned treatment), natural progression and supportive care.  Patient states they have good understanding and the treatment plan was agreed upon. Questions were encouraged and answered   Return to urgent care prn if new or worsening sx or if there is no improvement in condition prn.    Advised the patient to follow-up with the primary care physician for recheck, reevaluation, and consideration of further  management.  I instructed patient to get a pharmacy consult when picking up any prescribed medications.  Strict ER precautions discussed for any escalating abdominal pain, fever, chills, nausea or vomiting, blood in the stool, no bowel movement despite the treatment plan today.  Patient states they understand all instructions.     I personally reviewed prior external notes and test results pertinent to today's visit.  I have independently reviewed and interpreted all diagnostics ordered during this urgent care acute visit.        Please note that this dictation was created using voice recognition software. I have made a reasonable attempt to correct obvious errors, but I expect that there are errors of grammar and possibly content that I did not discover before finalizing the note.    This note was electronically signed by Tim SIMON, GERMAN, [place], [place]